# Patient Record
Sex: FEMALE | Race: BLACK OR AFRICAN AMERICAN | NOT HISPANIC OR LATINO | Employment: OTHER | URBAN - METROPOLITAN AREA
[De-identification: names, ages, dates, MRNs, and addresses within clinical notes are randomized per-mention and may not be internally consistent; named-entity substitution may affect disease eponyms.]

---

## 2017-04-07 ENCOUNTER — TELEPHONE (OUTPATIENT)
Dept: UROLOGY | Facility: CLINIC | Age: 72
End: 2017-04-07

## 2017-04-07 NOTE — TELEPHONE ENCOUNTER
Patient indicated that she would be running late for her apt next wed. This has been noted in the apt notes. Just sending this over for your FYI.

## 2017-04-07 NOTE — TELEPHONE ENCOUNTER
"----- Message from Margie Cheema sent at 4/7/2017  8:12 AM CDT -----  Contact: Patient herself  X  1st Request  _  2nd Request  _  3rd Request    Who:  Colette Smith (mrn# [id number])    Why: Patient called and said, "she intends to keep her appointment on next week / Wednesday 04/12/2017 but she will be running late."  THANKS!     What Number to Call Back:  # [phone]  When to Expect a call back: (Before the end of the day)   -- if the call is after 12:00, the call back will be tomorrow.                        "

## 2017-04-12 ENCOUNTER — TELEPHONE (OUTPATIENT)
Dept: UROLOGY | Facility: CLINIC | Age: 72
End: 2017-04-12

## 2017-04-12 ENCOUNTER — OFFICE VISIT (OUTPATIENT)
Dept: UROLOGY | Facility: CLINIC | Age: 72
End: 2017-04-12
Attending: UROLOGY
Payer: MEDICARE

## 2017-04-12 VITALS
SYSTOLIC BLOOD PRESSURE: 123 MMHG | HEIGHT: 63 IN | WEIGHT: 220 LBS | BODY MASS INDEX: 38.98 KG/M2 | DIASTOLIC BLOOD PRESSURE: 62 MMHG | HEART RATE: 47 BPM

## 2017-04-12 DIAGNOSIS — N39.46 MIXED INCONTINENCE URGE AND STRESS: Primary | ICD-10-CM

## 2017-04-12 LAB
BILIRUB SERPL-MCNC: NORMAL MG/DL
BLOOD URINE, POC: NORMAL
COLOR, POC UA: YELLOW
GLUCOSE UR QL STRIP: NORMAL
KETONES UR QL STRIP: NORMAL
LEUKOCYTE ESTERASE URINE, POC: NORMAL
NITRITE, POC UA: NORMAL
PH, POC UA: 5
POC RESIDUAL URINE VOLUME: 37 ML (ref 0–100)
PROTEIN, POC: NORMAL
SPECIFIC GRAVITY, POC UA: 1.01
UROBILINOGEN, POC UA: NORMAL

## 2017-04-12 PROCEDURE — 1160F RVW MEDS BY RX/DR IN RCRD: CPT | Mod: S$GLB,,, | Performed by: UROLOGY

## 2017-04-12 PROCEDURE — 1157F ADVNC CARE PLAN IN RCRD: CPT | Mod: S$GLB,,, | Performed by: UROLOGY

## 2017-04-12 PROCEDURE — 1159F MED LIST DOCD IN RCRD: CPT | Mod: S$GLB,,, | Performed by: UROLOGY

## 2017-04-12 PROCEDURE — 51798 US URINE CAPACITY MEASURE: CPT | Mod: S$GLB,,, | Performed by: UROLOGY

## 2017-04-12 PROCEDURE — 1126F AMNT PAIN NOTED NONE PRSNT: CPT | Mod: S$GLB,,, | Performed by: UROLOGY

## 2017-04-12 PROCEDURE — 81002 URINALYSIS NONAUTO W/O SCOPE: CPT | Mod: S$GLB,,, | Performed by: UROLOGY

## 2017-04-12 PROCEDURE — 99999 PR PBB SHADOW E&M-EST. PATIENT-LVL III: CPT | Mod: PBBFAC,,, | Performed by: UROLOGY

## 2017-04-12 PROCEDURE — 99203 OFFICE O/P NEW LOW 30 MIN: CPT | Mod: 25,S$GLB,, | Performed by: UROLOGY

## 2017-04-12 RX ORDER — DILTIAZEM HYDROCHLORIDE 90 MG/1
90 TABLET, FILM COATED ORAL 2 TIMES DAILY
Refills: 1 | COMMUNITY
Start: 2017-03-10 | End: 2018-08-29

## 2017-04-12 RX ORDER — CLONIDINE HYDROCHLORIDE 0.2 MG/1
0.2 TABLET ORAL 2 TIMES DAILY
Refills: 0 | COMMUNITY
Start: 2017-03-21 | End: 2018-08-29

## 2017-04-12 RX ORDER — NAPROXEN SODIUM 220 MG/1
81 TABLET, FILM COATED ORAL DAILY
Refills: 0 | COMMUNITY
Start: 2017-03-21

## 2017-04-12 RX ORDER — AMLODIPINE AND VALSARTAN 10; 160 MG/1; MG/1
1 TABLET ORAL DAILY
Refills: 0 | COMMUNITY
Start: 2017-02-17 | End: 2018-12-05

## 2017-04-12 RX ORDER — CARVEDILOL 12.5 MG/1
TABLET ORAL
Refills: 0 | COMMUNITY
Start: 2017-01-23

## 2017-04-12 RX ORDER — ATORVASTATIN CALCIUM 40 MG/1
40 TABLET, FILM COATED ORAL DAILY
Refills: 0 | COMMUNITY
Start: 2017-03-02

## 2017-04-12 RX ORDER — MIRABEGRON 50 MG/1
1 TABLET, FILM COATED, EXTENDED RELEASE ORAL DAILY
Refills: 0 | COMMUNITY
Start: 2017-03-01 | End: 2017-11-22 | Stop reason: SDUPTHER

## 2017-04-12 RX ORDER — SOLIFENACIN SUCCINATE 10 MG/1
10 TABLET, FILM COATED ORAL DAILY
Qty: 30 TABLET | Refills: 11 | Status: SHIPPED | OUTPATIENT
Start: 2017-04-12 | End: 2017-11-22 | Stop reason: SDUPTHER

## 2017-04-12 RX ORDER — IBUPROFEN 200 MG
200 TABLET ORAL DAILY PRN
COMMUNITY

## 2017-04-12 NOTE — TELEPHONE ENCOUNTER
Provider still in chart and note unsigned- can you please advise that patient will continue both medication or stop one. Thank you.

## 2017-04-12 NOTE — TELEPHONE ENCOUNTER
----- Message from Nila Augie sent at 4/12/2017  8:46 AM CDT -----  Contact: [phone] Yesenia  x_  1st Request  _  2nd Request  _  3rd Request        Who: [facility]    Why: Yesenia called she stated that the patient is already on a urinary incontinence medication and she just wanted to make sure that the office was aware of that. Before filling the prescription for  solifenacin (VESICARE) 10 MG tablet 30 tablet. Please call her to confirm.    What Number to Call Back: [phone]    When to Expect a call back: (Before the end of the day)   -- if the call is after 12:00, the call back will be tomorrow.                         solifenacin (VESICARE) 10 MG tablet 30 tablet

## 2017-04-12 NOTE — LETTER
April 12, 2017      Iris Jacob, NP  [address]  [facility] [address]           [facility]  [address] [place] [address], [facility] [address]  [facility] [address]  Phone: [phone]          Patient: Colette Manriquez   MR Number: [id number]   YOB: 1945   Date of Visit: 4/12/2017       Dear Iris Jacob:    Thank you for referring Colette Manriquez to me for evaluation. Attached you will find relevant portions of my assessment and plan of care.    If you have questions, please do not hesitate to call me. I look forward to following Colette Manriquez along with you.    Sincerely,    Fay Dobbs MD    Enclosure  CC:  No Recipients    If you would like to receive this communication electronically, please contact [email or web address] or [phone] to request more information on [employer] Link access.    For providers and/or their staff who would like to refer a patient to [facility], please contact us through our one-stop-shop provider referral line, [facility], at [phone].    If you feel you have received this communication in error or would no longer like to receive these types of communications, please e-mail [email or web address]

## 2017-04-12 NOTE — MR AVS SNAPSHOT
[facility]  [address], [address]  [facility] [address]  Phone: [phone]                  Colette Manriquez   2017 8:00 AM   Office Visit    Description:  Female : 1945   Provider:  Fay Dobbs MD   Department:  [facility]           Reason for Visit     Advice Only           Diagnoses this Visit        Comments    Mixed incontinence urge and stress    -  Primary            To Do List           Goals (5 Years of Data)     None      Follow-Up and Disposition     Return in about 6 weeks (around 2017) for Follow up on new medication, PVR check.       These Medications        Disp Refills Start End    solifenacin (VESICARE) 10 MG tablet 30 tablet 11 2017     Take 1 tablet (10 mg total) by mouth once daily. - Oral    [facility] [place], [place] - [address] [place] [address] [facility]: [phone]         [facility]     [facility] Care [facility] -  Assistance  Unless otherwise directed by your provider, please contact [facility], our nurse care line that is available for  assistance.     Registered nurses in the [facility] provide: appointment scheduling, clinical advisement, health education, and other advisory services.  Call: [phone] (toll free)               Medications           START taking these NEW medications        Refills    solifenacin (VESICARE) 10 MG tablet 11    Sig: Take 1 tablet (10 mg total) by mouth once daily.    Class: Normal    Route: Oral           Verify that the below list of medications is an accurate representation of the medications you are currently taking.  If none reported, the list may be blank. If incorrect, please contact your healthcare provider. Carry this list with you in case of emergency.           Current Medications     amlodipine-valsartan (EXFORGE)  mg per tablet Take 1 tablet by mouth once daily.    aspirin 81 MG Chew Take 81 mg by mouth  "once daily.    atorvastatin (LIPITOR) 40 MG tablet Take 40 mg by mouth once daily.    carvedilol (COREG) 12.5 MG tablet     cloNIDine (CATAPRES) 0.2 MG tablet Take 0.2 mg by mouth 2 (two) times daily.    diltiaZEM (CARDIZEM) 90 MG tablet Take 90 mg by mouth 2 (two) times daily.    ibuprofen (ADVIL,MOTRIN) 200 MG tablet Take 200 mg by mouth daily as needed for Pain.    MYRBETRIQ 50 mg Tb24 Take 1 tablet by mouth once daily.    solifenacin (VESICARE) 10 MG tablet Take 1 tablet (10 mg total) by mouth once daily.           Clinical Reference Information           Your Vitals Were     BP Pulse Height Weight BMI    123/62 47 5' 2.5" (1.588 m) 99.8 kg (220 lb) 39.6 kg/m2      Blood Pressure          Most Recent Value    BP  123/62      Allergies as of 4/12/2017     Vicodin [Hydrocodone-acetaminophen]      Immunizations Administered on Date of Encounter - 4/12/2017     None      Orders Placed During Today's Visit      Normal Orders This Visit    POCT Bladder Scan     POCT URINE DIPSTICK WITHOUT MICROSCOPE       [facility] Sign-Up     Activating your [facility] account is as easy as 1-2-3!     1) Visit [email or web address], select Sign Up Now, enter this activation code and your date of birth, then select Next.  [email or web address]  Expires: 5/27/2017  8:37 AM      2) Create a username and password to use when you visit [facility] in the future and select a security question in case you lose your password and select Next.    3) Enter your e-mail address and click Sign Up!    Additional Information  If you have questions, please e-mail [email or web address] or call [phone] to talk to our [facility] staff. Remember, [facility] is NOT to be used for urgent needs. For medical emergencies, dial 911.         Language Assistance Services     ATTENTION: Language assistance services are available, free of charge. Please call [phone].      ATENCIÓN: Si habla español, tiene a sanchez disposición servicios gratuitos de asistencia lingüística. " Roseann orellana [phone].     DARLENE Ý: N?u b?n nói Ti?ng Vi?t, có các d?ch v? h? tr? ngôn ng? mi?n phí dành cho b?n. G?i s? [phone].         [facility] complies with applicable Federal civil rights laws and does not discriminate on the basis of race, color, national origin, age, disability, or sex.

## 2017-05-24 ENCOUNTER — OFFICE VISIT (OUTPATIENT)
Dept: UROLOGY | Facility: CLINIC | Age: 72
End: 2017-05-24
Attending: UROLOGY
Payer: MEDICARE

## 2017-05-24 VITALS
HEIGHT: 63 IN | HEART RATE: 62 BPM | SYSTOLIC BLOOD PRESSURE: 148 MMHG | BODY MASS INDEX: 38.98 KG/M2 | WEIGHT: 220 LBS | DIASTOLIC BLOOD PRESSURE: 81 MMHG

## 2017-05-24 DIAGNOSIS — N39.46 MIXED INCONTINENCE URGE AND STRESS: Primary | ICD-10-CM

## 2017-05-24 PROCEDURE — 1159F MED LIST DOCD IN RCRD: CPT | Mod: S$GLB,,, | Performed by: UROLOGY

## 2017-05-24 PROCEDURE — 99214 OFFICE O/P EST MOD 30 MIN: CPT | Mod: 25,S$GLB,, | Performed by: UROLOGY

## 2017-05-24 PROCEDURE — 99999 PR PBB SHADOW E&M-EST. PATIENT-LVL III: CPT | Mod: PBBFAC,,, | Performed by: UROLOGY

## 2017-05-24 PROCEDURE — 1160F RVW MEDS BY RX/DR IN RCRD: CPT | Mod: S$GLB,,, | Performed by: UROLOGY

## 2017-05-24 PROCEDURE — 51798 US URINE CAPACITY MEASURE: CPT | Mod: S$GLB,,, | Performed by: UROLOGY

## 2017-05-24 PROCEDURE — 1126F AMNT PAIN NOTED NONE PRSNT: CPT | Mod: S$GLB,,, | Performed by: UROLOGY

## 2017-05-24 NOTE — PROGRESS NOTES
Subjective:       Colette Manriquez is a 71 y.o. female who is a new patient who was referred by Dr Jacob for evaluation of UI.      Urinary Incontinence  Patient complains of urinary incontinence. This has been present for many years. She leaks urine with coughing, lifting, laughing, sneezing, insensate leakage, and with urge (most bothersome). Patient describes the symptoms as frequent urination (manyx per day), nocturia 3 times per night, sensation of incomplete emptying of bladder, urge to urinate with little or no warning and urine leaking unpredictably. Factors associated with symptoms include none known. Evaluation to date includes none. Treatment to date includes Botox 200u by Dr Tobar in 12/2011 that was helpful. She remains on Myrbetriq now, not as helpful as previous. She tried PFPT in the past.     She is wearing diapers at night, multiple pads during day. She drinks a lot of water but avoids bladder irritants. Denies UTIs.     PVR (bladder scan) last visit: 37-80cc (on different scans), today - 21cc (not true PVR)    She is now with Myrbetriq and Vesicare - some improvement but still with diapers at night and pads during day.       The following portions of the patient's history were reviewed and updated as appropriate: allergies, current medications, past family history, past medical history, past social history, past surgical history and problem list.    Review of Systems  Constitutional: no fever or chills  ENT: no nasal congestion or sore throat  Respiratory: no cough or shortness of breath  Cardiovascular: no chest pain or palpitations  Gastrointestinal: no nausea or vomiting, tolerating diet  Genitourinary: as per HPI  Hematologic/Lymphatic: no easy bruising or lymphadenopathy  Musculoskeletal: no arthralgias or myalgias  Skin: no rashes or lesions  Neurological: no seizures or tremors  Behavioral/Psych: no auditory or visual hallucinations       Objective:    Vitals: BP (!) 148/81 (BP  "Location: Right arm, Patient Position: Sitting, BP Method: Automatic)   Pulse 62   Ht 5' 2.5" (1.588 m)   Wt 99.8 kg (220 lb)   BMI 39.60 kg/m²     Physical Exam   General: well developed, well nourished in no acute distress  Head: normocephalic, atraumatic  Neck: supple, trachea midline, no obvious enlargement of thyroid  HEENT: EOMI, mucus membranes moist, sclera anicteric, no hearing impairment  Lungs: symmetric expansion, non-labored breathing  Cardiovascular: regular rate and rhythm, normal pulses  Abdomen: soft, non tender, non distended, no palpable masses, no hepatosplenomegaly, no hernias, no CVA tenderness  Musculoskeletal: no peripheral edema, normal ROM in bilateral upper and lower extremities  Lymphatics: no cervical or inguinal lymphadenopathy  Skin: no rashes or lesions  Neuro: alert and oriented x 3, no gross deficits  Psych: normal judgment and insight, normal mood/affect and non-anxious  Genitourinary:   patient declined exam      Lab Review   Urine analysis today in clinic shows negative for all components     Lab Results   Component Value Date    WBC 3.60 (L) 12/05/2011    HGB 12.4 12/05/2011    HCT 38.2 12/05/2011    MCV 86.6 12/05/2011     12/05/2011     Lab Results   Component Value Date    CREATININE 1.2 12/05/2011    BUN 22 12/05/2011       Imaging  NA       Assessment/Plan:      1. Mixed incontinence urge and stress    - Discussed difference of UUI and JE components. Reviewed etiology and workup of each.   - JE: Kegels, PFPT, pessary, bulking agent, MUS.   - UUI: Behavioral changes, PFPT, anticholinergics, mirabegron. Botox/InterStim for refractory UUI.   - UUI much more bothersome   - Prior Botox in 2011   - Continue Myrbetriq, Vesicare   - Consider cysto/UDS in near future. ?Repeat Botox ?InterStim. She is not inclined to pursue this at this time. Will consider Botox in future.            Follow up in 6 months with PVR     "

## 2017-11-22 ENCOUNTER — OFFICE VISIT (OUTPATIENT)
Dept: UROLOGY | Facility: CLINIC | Age: 72
End: 2017-11-22
Attending: UROLOGY
Payer: MEDICARE

## 2017-11-22 VITALS
WEIGHT: 220 LBS | SYSTOLIC BLOOD PRESSURE: 140 MMHG | DIASTOLIC BLOOD PRESSURE: 78 MMHG | BODY MASS INDEX: 38.98 KG/M2 | HEART RATE: 70 BPM | HEIGHT: 63 IN

## 2017-11-22 DIAGNOSIS — N39.46 MIXED INCONTINENCE URGE AND STRESS: Primary | ICD-10-CM

## 2017-11-22 LAB
BILIRUB SERPL-MCNC: ABNORMAL MG/DL
BLOOD URINE, POC: 50
COLOR, POC UA: YELLOW
GLUCOSE UR QL STRIP: ABNORMAL
KETONES UR QL STRIP: ABNORMAL
LEUKOCYTE ESTERASE URINE, POC: ABNORMAL
NITRITE, POC UA: ABNORMAL
PH, POC UA: 5
POC RESIDUAL URINE VOLUME: 29 ML (ref 0–100)
PROTEIN, POC: ABNORMAL
SPECIFIC GRAVITY, POC UA: 1.01
UROBILINOGEN, POC UA: ABNORMAL

## 2017-11-22 PROCEDURE — 81002 URINALYSIS NONAUTO W/O SCOPE: CPT | Mod: S$GLB,,, | Performed by: UROLOGY

## 2017-11-22 PROCEDURE — 99213 OFFICE O/P EST LOW 20 MIN: CPT | Mod: 25,S$GLB,, | Performed by: UROLOGY

## 2017-11-22 PROCEDURE — 51798 US URINE CAPACITY MEASURE: CPT | Mod: S$GLB,,, | Performed by: UROLOGY

## 2017-11-22 RX ORDER — SOLIFENACIN SUCCINATE 10 MG/1
10 TABLET, FILM COATED ORAL DAILY
Qty: 90 TABLET | Refills: 3 | Status: SHIPPED | OUTPATIENT
Start: 2017-11-22 | End: 2018-12-05 | Stop reason: SDUPTHER

## 2017-11-22 RX ORDER — MIRABEGRON 50 MG/1
1 TABLET, FILM COATED, EXTENDED RELEASE ORAL DAILY
Qty: 90 TABLET | Refills: 3 | Status: SHIPPED | OUTPATIENT
Start: 2017-11-22 | End: 2018-08-29

## 2017-11-22 NOTE — PROGRESS NOTES
Subjective:       Colette Manriquez is a 71 y.o. female who is an established patient who was referred by Dr Jacob for evaluation of UI.      Urinary Incontinence  Patient complains of urinary incontinence. This has been present for many years. She leaks urine with coughing, lifting, laughing, sneezing, insensate leakage, and with urge (most bothersome). Patient describes the symptoms as frequent urination (manyx per day), nocturia 3 times per night, sensation of incomplete emptying of bladder, urge to urinate with little or no warning and urine leaking unpredictably. Factors associated with symptoms include none known. Evaluation to date includes none. Treatment to date includes Botox 200u by Dr Tobar in 12/2011 that was helpful. She remains on Myrbetriq now, not as helpful as previous. She tried PFPT in the past.     She is wearing diapers at night, multiple pads during day. She drinks a lot of water but avoids bladder irritants. Denies UTIs.     PVR (bladder scan) last visits: 37-80cc (on different scans),  21cc (not true PVR). Today - 29cc.    She is now with Myrbetriq and Vesicare - some improvement but still with diapers at night and pads during day. Declined further intervention previously. She is happy with symptoms.       The following portions of the patient's history were reviewed and updated as appropriate: allergies, current medications, past family history, past medical history, past social history, past surgical history and problem list.    Review of Systems  Constitutional: no fever or chills  ENT: no nasal congestion or sore throat  Respiratory: no cough or shortness of breath  Cardiovascular: no chest pain or palpitations  Gastrointestinal: no nausea or vomiting, tolerating diet  Genitourinary: as per HPI  Hematologic/Lymphatic: no easy bruising or lymphadenopathy  Musculoskeletal: no arthralgias or myalgias  Skin: no rashes or lesions  Neurological: no seizures or tremors  Behavioral/Psych:  "no auditory or visual hallucinations       Objective:    Vitals: BP (!) 140/78 (BP Location: Right arm, Patient Position: Sitting, BP Method: X-Large (Automatic))   Pulse 70   Ht 5' 2.5" (1.588 m)   Wt 99.8 kg (220 lb)   BMI 39.60 kg/m²     Physical Exam   General: well developed, well nourished in no acute distress  Head: normocephalic, atraumatic  Neck: supple, trachea midline, no obvious enlargement of thyroid  HEENT: EOMI, mucus membranes moist, sclera anicteric, no hearing impairment  Lungs: symmetric expansion, non-labored breathing  Cardiovascular: regular rate and rhythm, normal pulses  Abdomen: soft, non tender, non distended, no palpable masses, no hepatosplenomegaly, no hernias, no CVA tenderness  Musculoskeletal: no peripheral edema, normal ROM in bilateral upper and lower extremities  Lymphatics: no cervical or inguinal lymphadenopathy  Skin: no rashes or lesions  Neuro: alert and oriented x 3, no gross deficits  Psych: normal judgment and insight, normal mood/affect and non-anxious  Genitourinary:   patient declined exam      Lab Review   Urine analysis today in clinic shows - +LE, 50 RBCs    Lab Results   Component Value Date    WBC 3.60 (L) 12/05/2011    HGB 12.4 12/05/2011    HCT 38.2 12/05/2011    MCV 86.6 12/05/2011     12/05/2011     Lab Results   Component Value Date    CREATININE 1.2 12/05/2011    BUN 22 12/05/2011       Imaging  NA       Assessment/Plan:      1. Mixed incontinence urge and stress    - Discussed difference of UUI and JE components. Reviewed etiology and workup of each.   - JE: Kegels, PFPT, pessary, bulking agent, MUS.   - UUI: Behavioral changes, PFPT, anticholinergics, mirabegron. Botox/InterStim for refractory UUI.   - UUI much more bothersome   - Prior Botox in 2011   - Continue Myrbetriq, Vesicare   - Consider cysto/UDS in near future. ?Repeat Botox ?InterStim. She is not inclined to pursue this at this time. Will consider Botox in future.             Follow " up in 12 months with PVR

## 2017-11-28 ENCOUNTER — TELEPHONE (OUTPATIENT)
Dept: UROLOGY | Facility: CLINIC | Age: 72
End: 2017-11-28

## 2017-11-28 NOTE — TELEPHONE ENCOUNTER
----- Message from Ronda Shell sent at 11/28/2017 10:39 AM CST -----  Contact: [employer]  solifenacin (VESICARE) 10 MG tablet  MYRBETRIQ 50 mg Tb24      Who: Antonia Jasso from [employer]    Why: Requesting a call back in regards to the two RX listed above, the are similar      What Number to Call Back: [phone]    When to Expect a call back: (Within 24 hours)    Please return the call at earliest convenience. Thanks!

## 2018-08-29 ENCOUNTER — OFFICE VISIT (OUTPATIENT)
Dept: GASTROENTEROLOGY | Facility: CLINIC | Age: 73
End: 2018-08-29
Payer: MEDICARE

## 2018-08-29 ENCOUNTER — TELEPHONE (OUTPATIENT)
Dept: ENDOSCOPY | Facility: HOSPITAL | Age: 73
End: 2018-08-29

## 2018-08-29 VITALS
WEIGHT: 207.25 LBS | SYSTOLIC BLOOD PRESSURE: 129 MMHG | HEART RATE: 58 BPM | HEIGHT: 62 IN | BODY MASS INDEX: 38.14 KG/M2 | DIASTOLIC BLOOD PRESSURE: 74 MMHG

## 2018-08-29 DIAGNOSIS — R19.5 OCCULT BLOOD IN STOOLS: Primary | ICD-10-CM

## 2018-08-29 DIAGNOSIS — Z12.11 SPECIAL SCREENING FOR MALIGNANT NEOPLASMS, COLON: ICD-10-CM

## 2018-08-29 DIAGNOSIS — Z12.11 SPECIAL SCREENING FOR MALIGNANT NEOPLASMS, COLON: Primary | ICD-10-CM

## 2018-08-29 PROCEDURE — 99204 OFFICE O/P NEW MOD 45 MIN: CPT | Mod: S$GLB,,, | Performed by: INTERNAL MEDICINE

## 2018-08-29 PROCEDURE — 99999 PR PBB SHADOW E&M-EST. PATIENT-LVL III: CPT | Mod: PBBFAC,,, | Performed by: INTERNAL MEDICINE

## 2018-08-29 RX ORDER — POLYETHYLENE GLYCOL 3350, SODIUM SULFATE ANHYDROUS, SODIUM BICARBONATE, SODIUM CHLORIDE, POTASSIUM CHLORIDE 236; 22.74; 6.74; 5.86; 2.97 G/4L; G/4L; G/4L; G/4L; G/4L
4 POWDER, FOR SOLUTION ORAL ONCE
Qty: 4000 ML | Refills: 0 | Status: SHIPPED | OUTPATIENT
Start: 2018-08-29 | End: 2018-08-30

## 2018-08-29 RX ORDER — POLYETHYLENE GLYCOL 3350, SODIUM SULFATE ANHYDROUS, SODIUM BICARBONATE, SODIUM CHLORIDE, POTASSIUM CHLORIDE 236; 22.74; 6.74; 5.86; 2.97 G/4L; G/4L; G/4L; G/4L; G/4L
4 POWDER, FOR SOLUTION ORAL ONCE
Qty: 4000 ML | Refills: 0 | Status: SHIPPED | OUTPATIENT
Start: 2018-08-29 | End: 2018-08-29 | Stop reason: SDUPTHER

## 2018-08-29 NOTE — PROGRESS NOTES
REFERRING PHYSICIAN:  Arturo Clark M.D.    REASON FOR VISIT:  Occult positive blood in the stool.    HISTORY OF PRESENT ILLNESS:  Ms. Manriquez has been referred to us by Dr. Clark from [facility] because of occult blood positive in the   stool.  Ms. Manriquez denies any overt gastrointestinal bleeding.  Denies any   dysphagia, odynophagia, nausea, vomiting, abdominal pain.  No changes in bowel   pattern.  She usually moves her bowels every other day and occasionally on a   daily basis.  She has not noticed any red blood, melena or maroon stools.  She   does take aspirin 81 mg daily and ibuprofen once or twice every few weeks.  She   denies any unintentional weight loss.  Her appetite is normal.  No recent   anticoagulants in her medication regimen.    PAST MEDICAL, SURGICAL, SOCIAL AND FAMILY HISTORY:  Reviewed.    MEDICATIONS AND ALLERGIES:  Reviewed.    REVIEW OF SYSTEMS:  CONSTITUTIONAL:  No fever, no chills, no weight loss.  Appetite is normal.  EYES:  No visual changes.  ENT:  No odynophagia or hoarseness of voice.  CARDIOVASCULAR:  No angina or palpitation.  RESPIRATORY:  No shortness of breath or wheezing.  GENITOURINARY:  No dysuria or frequency.  MUSCULOSKELETAL:  Occasional arthralgias and headaches.  SKIN:  No pruritus or eczema.  NEUROLOGIC:  No seizures.  PSYCHIATRIC:  No anxiety or depression.  GASTROINTESTINAL:  See HPI.    PHYSICAL EXAMINATION:  VITAL SIGNS:  See EPIC.  GENERAL:  Alert and oriented x3, no acute distress.  NECK:  Supple, no carotid bruit.  No cervical adenopathy.  ABDOMEN:  Obese, soft, nondistended, nontender.  No masses palpable.  No   hepatosplenomegaly appreciated.  Bowel sounds are normal.  EYES:  Conjunctivae anicteric.  ENT:  Oropharynx, mucosa moist.  Mallampati 2.  CARDIOVASCULAR:  S1, S2 normal.  RESPIRATORY:  Bilateral air entry equal.  SKIN:  No palmar erythema or spider angiomata.  NEUROLOGIC:  No asterixis.  PSYCHIATRIC:  Affect appropriate.  LOWER  EXTREMITIES:  No pedal edema.    IMPRESSION:  Fecal occult blood positive.    RECOMMENDATION:  Proceed with EGD and colonoscopy and further recommendation   will be based on above.      ACT/HN  dd: 08/29/2018 11:44:46 (CDT)  td: 08/30/2018 05:03:20 (CDT)  Doc ID   #[id number]  Job ID #[id number]    CC:

## 2018-08-29 NOTE — LETTER
August 29, 2018      Arturo Clark MD  [address] [place] [address]  [place]  [place] [address]           [facility]  [address]  [facility] [address]  Phone: [phone]  Fax: [phone]          Patient: Colette Manriquez   MR Number: [id number]   YOB: 1945   Date of Visit: 8/29/2018       Dear Dr. Arturo Clark:    Thank you for referring Colette Manriquez to me for evaluation. Attached you will find relevant portions of my assessment and plan of care.    If you have questions, please do not hesitate to call me. I look forward to following Colette Manriquez along with you.    Sincerely,    Binh Holt MD    Enclosure  CC:  No Recipients    If you would like to receive this communication electronically, please contact [email or web address] or [phone] to request more information on [employer] Link access.    For providers and/or their staff who would like to refer a patient to [facility], please contact us through our one-stop-shop provider referral line, [facility], at [phone].    If you feel you have received this communication in error or would no longer like to receive these types of communications, please e-mail [email or web address]

## 2018-10-02 ENCOUNTER — ANESTHESIA EVENT (OUTPATIENT)
Dept: ENDOSCOPY | Facility: HOSPITAL | Age: 73
End: 2018-10-02
Payer: MEDICARE

## 2018-10-03 ENCOUNTER — ANESTHESIA (OUTPATIENT)
Dept: ENDOSCOPY | Facility: HOSPITAL | Age: 73
End: 2018-10-03
Payer: MEDICARE

## 2018-10-03 ENCOUNTER — HOSPITAL ENCOUNTER (OUTPATIENT)
Facility: HOSPITAL | Age: 73
Discharge: HOME | End: 2018-10-03
Attending: INTERNAL MEDICINE | Admitting: INTERNAL MEDICINE
Payer: MEDICARE

## 2018-10-03 VITALS
TEMPERATURE: 98 F | BODY MASS INDEX: 40.48 KG/M2 | WEIGHT: 220 LBS | DIASTOLIC BLOOD PRESSURE: 72 MMHG | OXYGEN SATURATION: 100 % | HEIGHT: 62 IN | HEART RATE: 59 BPM | SYSTOLIC BLOOD PRESSURE: 145 MMHG | RESPIRATION RATE: 18 BRPM

## 2018-10-03 DIAGNOSIS — R19.5 OCCULT BLOOD IN STOOLS: Primary | ICD-10-CM

## 2018-10-03 PROCEDURE — 43239 EGD BIOPSY SINGLE/MULTIPLE: CPT | Performed by: INTERNAL MEDICINE

## 2018-10-03 PROCEDURE — 88342 IMHCHEM/IMCYTCHM 1ST ANTB: CPT | Mod: 26,,, | Performed by: PATHOLOGY

## 2018-10-03 PROCEDURE — 25000003 PHARM REV CODE 250: Performed by: NURSE ANESTHETIST, CERTIFIED REGISTERED

## 2018-10-03 PROCEDURE — 43239 EGD BIOPSY SINGLE/MULTIPLE: CPT | Mod: 51,,, | Performed by: INTERNAL MEDICINE

## 2018-10-03 PROCEDURE — 37000008 HC ANESTHESIA 1ST 15 MINUTES: Performed by: INTERNAL MEDICINE

## 2018-10-03 PROCEDURE — 45378 DIAGNOSTIC COLONOSCOPY: CPT | Performed by: INTERNAL MEDICINE

## 2018-10-03 PROCEDURE — 88305 TISSUE EXAM BY PATHOLOGIST: CPT | Performed by: PATHOLOGY

## 2018-10-03 PROCEDURE — 63600175 PHARM REV CODE 636 W HCPCS: Performed by: NURSE ANESTHETIST, CERTIFIED REGISTERED

## 2018-10-03 PROCEDURE — E9220 PRA ENDO ANESTHESIA: HCPCS | Mod: ,,, | Performed by: NURSE ANESTHETIST, CERTIFIED REGISTERED

## 2018-10-03 PROCEDURE — 25000003 PHARM REV CODE 250: Performed by: INTERNAL MEDICINE

## 2018-10-03 PROCEDURE — 45378 DIAGNOSTIC COLONOSCOPY: CPT | Mod: ,,, | Performed by: INTERNAL MEDICINE

## 2018-10-03 PROCEDURE — 37000009 HC ANESTHESIA EA ADD 15 MINS: Performed by: INTERNAL MEDICINE

## 2018-10-03 PROCEDURE — 27201012 HC FORCEPS, HOT/COLD, DISP: Performed by: INTERNAL MEDICINE

## 2018-10-03 PROCEDURE — 88305 TISSUE EXAM BY PATHOLOGIST: CPT | Mod: 26,,, | Performed by: PATHOLOGY

## 2018-10-03 RX ORDER — GLYCOPYRROLATE 0.2 MG/ML
INJECTION INTRAMUSCULAR; INTRAVENOUS
Status: DISCONTINUED | OUTPATIENT
Start: 2018-10-03 | End: 2018-10-03

## 2018-10-03 RX ORDER — PROPOFOL 10 MG/ML
VIAL (ML) INTRAVENOUS
Status: DISCONTINUED | OUTPATIENT
Start: 2018-10-03 | End: 2018-10-03

## 2018-10-03 RX ORDER — PROPOFOL 10 MG/ML
VIAL (ML) INTRAVENOUS CONTINUOUS PRN
Status: DISCONTINUED | OUTPATIENT
Start: 2018-10-03 | End: 2018-10-03

## 2018-10-03 RX ORDER — SODIUM CHLORIDE 9 MG/ML
INJECTION, SOLUTION INTRAVENOUS CONTINUOUS
Status: DISCONTINUED | OUTPATIENT
Start: 2018-10-03 | End: 2018-10-03 | Stop reason: HOSPADM

## 2018-10-03 RX ADMIN — SODIUM CHLORIDE: 0.9 INJECTION, SOLUTION INTRAVENOUS at 07:10

## 2018-10-03 RX ADMIN — PROPOFOL 50 MG: 10 INJECTION, EMULSION INTRAVENOUS at 08:10

## 2018-10-03 RX ADMIN — PROPOFOL 150 MCG/KG/MIN: 10 INJECTION, EMULSION INTRAVENOUS at 08:10

## 2018-10-03 RX ADMIN — TOPICAL ANESTHETIC 1 EACH: 200 SPRAY DENTAL; PERIODONTAL at 08:10

## 2018-10-03 RX ADMIN — GLYCOPYRROLATE 0.1 MG: 0.2 INJECTION, SOLUTION INTRAMUSCULAR; INTRAVENOUS at 08:10

## 2018-10-03 NOTE — [employer] PATIENT INSTRUCTIONS
Discharge Summary/Instructions after an Endoscopic Procedure  Patient Name: Colette Manriquez  Patient MRN: [id number]  Patient YOB: 1945 Wednesday, October 03, 2018  Bnih Holt MD  RESTRICTIONS:  During your procedure today, you received medications for sedation.  These   medications may affect your judgment, balance and coordination.  Therefore,   for 24 hours, you have the following restrictions:   - DO NOT drive a car, operate machinery, make legal/financial decisions,   sign important papers or drink alcohol.    ACTIVITY:  Today: no heavy lifting, straining or running due to procedural   sedation/anesthesia.  The following day: return to full activity including work.  DIET:  Eat and drink normally unless instructed otherwise.     TREATMENT FOR COMMON SIDE EFFECTS:  - Mild abdominal pain, nausea, belching, bloating or excessive gas:  rest,   eat lightly and use a heating pad.  - Sore Throat: treat with throat lozenges and/or gargle with warm salt   water.  - Because air was used during the procedure, expelling large amounts of air   from your rectum or belching is normal.  - If a bowel prep was taken, you may not have a bowel movement for 1-3 days.    This is normal.  SYMPTOMS TO WATCH FOR AND REPORT TO YOUR PHYSICIAN:  1. Abdominal pain or bloating, other than gas cramps.  2. Chest pain.  3. Back pain.  4. Signs of infection such as: chills or fever occurring within 24 hours   after the procedure.  5. Rectal bleeding, which would show as bright red, maroon, or black stools.   (A tablespoon of blood from the rectum is not serious, especially if   hemorrhoids are present.)  6. Vomiting.  7. Weakness or dizziness.  GO DIRECTLY TO THE NEAREST EMERGENCY ROOM IF YOU HAVE ANY OF THE FOLLOWING:      Difficulty breathing              Chills and/or fever over 101 F   Persistent vomiting and/or vomiting blood   Severe abdominal pain   Severe chest pain   Black, tarry stools   Bleeding- more than one  tablespoon   Any other symptom or condition that you feel may need urgent attention  Your doctor recommends these additional instructions:  If any biopsies were taken, your doctors clinic will contact you in 1 to 2   weeks with any results.  - Patient has a contact number available for emergencies.  The signs and   symptoms of potential delayed complications were discussed with the   patient.  Return to normal activities tomorrow.  Written discharge   instructions were provided to the patient.   - Discharge patient to home.   - Resume previous diet.   - Continue present medications.   - Await pathology results.   For questions, problems or results please call your physician - Binh Holt MD at Work:  [phone].  [facility], EMERGENCY ROOM PHONE NUMBER: [phone]  IF A COMPLICATION OR EMERGENCY SITUATION ARISES AND YOU ARE UNABLE TO REACH   YOUR PHYSICIAN - GO DIRECTLY TO THE EMERGENCY ROOM.  Binh Holt MD  10/3/2018 8:23:56 AM  This report has been verified and signed electronically.  [employer]

## 2018-10-03 NOTE — ANESTHESIA PREPROCEDURE EVALUATION
10/03/2018  Colette Manriquez is a 72 y.o., female.    Anesthesia Evaluation    I have reviewed the Patient Summary Reports.    I have reviewed the Nursing Notes.   I have reviewed the Medications.     Review of Systems  Anesthesia Hx:  No problems with previous Anesthesia    Social:  Former Smoker    Cardiovascular:   Hypertension hyperlipidemia        Physical Exam  General:  Well nourished    Airway/Jaw/Neck:  Airway Findings: Mouth Opening: Normal Tongue: Large  General Airway Assessment: Adult  Mallampati: II  Improves to II with phonation.      Dental:  Dental Findings: Upper Dentures, lower partial dentures              Anesthesia Plan  Type of Anesthesia, risks & benefits discussed:  Anesthesia Type:  general  Patient's Preference:   Intra-op Monitoring Plan: standard ASA monitors  Intra-op Monitoring Plan Comments:   Post Op Pain Control Plan:   Post Op Pain Control Plan Comments:   Induction:   IV  Beta Blocker:  Patient is on a Beta-Blocker and has received one dose within the past 24 hours (No further documentation required).       Informed Consent: Patient understands risks and agrees with Anesthesia plan.  Questions answered. Anesthesia consent signed with patient.  ASA Score: 2     Day of Surgery Review of History & Physical:  There are no significant changes.          Ready For Surgery From Anesthesia Perspective.

## 2018-10-03 NOTE — H&P
Short Stay Endoscopy History and Physical    PCP - Anand Hernandez Jr, MD    Procedure - EGD/Colonoscopy  Sedation: GA  ASA - per anesthesia  Mallampati - per anesthesia  History of Anesthesia problems - no  Family history Anesthesia problems -  no     HPI:  This is a 72 y.o. female here for evaluation of : Occult blood in stool    Reflux - no  Dysphagia - no  Abdominal pain - no  Diarrhea - no    ROS:  Constitutional: No fevers, chills, No weight loss  ENT: No allergies  CV: No chest pain  Pulm: No cough, No shortness of breath  Ophtho: No vision changes  GI: see HPI  Medical History:  has a past medical history of Back pain, Hypertension, and Incontinence.    Surgical History:  has a past surgical history that includes Cholecystectomy; Back surgery; and Hysterectomy.    Family History: family history includes Cancer in her father and mother; Heart disease in her father and mother; Hypertension in her father and mother.. Otherwise no colon cancer, inflammatory bowel disease, or GI malignancies.    Social History:  reports that she quit smoking about 21 years ago. She does not have any smokeless tobacco history on file. She reports that she drinks alcohol. She reports that she does not use drugs.    Review of patient's allergies indicates:   Allergen Reactions    Percocet [oxycodone-acetaminophen]     Vicodin [hydrocodone-acetaminophen] Hallucinations       Medications:   Medications Prior to Admission   Medication Sig Dispense Refill Last Dose    amlodipine-valsartan (EXFORGE)  mg per tablet Take 1 tablet by mouth once daily.  0 10/2/2018 at Unknown time    aspirin 81 MG Chew Take 81 mg by mouth once daily.  0 10/2/2018 at Unknown time    atorvastatin (LIPITOR) 40 MG tablet Take 40 mg by mouth once daily.  0 10/2/2018 at Unknown time    carvedilol (COREG) 12.5 MG tablet   0 10/2/2018 at Unknown time    ibuprofen (ADVIL,MOTRIN) 200 MG tablet Take 200 mg by mouth daily as needed for Pain.   Past Month at  Unknown time    solifenacin (VESICARE) 10 MG tablet Take 1 tablet (10 mg total) by mouth once daily. 90 tablet 3 10/2/2018 at Unknown time       Objective Findings:    Vital Signs: Per nursing notes.    Physical Exam:  General Appearance: Well appearing in no acute distress  Head:   Normocephalic, without obvious abnormality  Eyes:    No scleral icterus  Airway: Open  Neck: No restriction in mobility  Lungs: CTA bilaterally in anterior and posterior fields, no wheezes, no crackles.  Heart:  Regular rate and rhythm, S1, S2 normal, no murmurs heard  Abdomen: Soft, non tender, non distended      Labs:  Lab Results   Component Value Date    WBC 3.60 (L) 12/05/2011    HGB 12.4 12/05/2011    HCT 38.2 12/05/2011     12/05/2011     12/05/2011    K 4.8 12/05/2011     12/05/2011    CREATININE 1.2 12/05/2011    BUN 22 12/05/2011    CO2 23 12/05/2011         I have explained the risks and benefits of endoscopy procedures to the patient including but not limited to bleeding, perforation, infection, and death.    Thank you so much for allowing me to participate in the care of Colette Holt MD

## 2018-10-03 NOTE — TRANSFER OF CARE
"Anesthesia Transfer of Care Note    Patient: Colette Manriquez    Procedure(s) Performed: Procedure(s) (LRB):  EGD (ESOPHAGOGASTRODUODENOSCOPY) (N/A)  COLONOSCOPY (N/A)    Patient location: PACU    Anesthesia Type: general    Transport from OR: Transported from OR on room air with adequate spontaneous ventilation    Post pain: adequate analgesia    Post assessment: no apparent anesthetic complications and tolerated procedure well    Post vital signs: stable    Level of consciousness: sedated    Nausea/Vomiting: no nausea/vomiting    Complications: none    Transfer of care protocol was followed      Last vitals:   Visit Vitals  BP (!) 90/56   Pulse 63   Temp 36.6 °C (97.9 °F)   Resp 18   Ht 5' 2" (1.575 m)   Wt 99.8 kg (220 lb)   SpO2 97%   Breastfeeding? No   BMI 40.24 kg/m²     "

## 2018-10-03 NOTE — [employer] PATIENT INSTRUCTIONS
Discharge Summary/Instructions after an Endoscopic Procedure  Patient Name: Colette Manriquez  Patient MRN: [id number]  Patient YOB: 1945 Wednesday, October 03, 2018  Binh Holt MD  RESTRICTIONS:  During your procedure today, you received medications for sedation.  These   medications may affect your judgment, balance and coordination.  Therefore,   for 24 hours, you have the following restrictions:   - DO NOT drive a car, operate machinery, make legal/financial decisions,   sign important papers or drink alcohol.    ACTIVITY:  Today: no heavy lifting, straining or running due to procedural   sedation/anesthesia.  The following day: return to full activity including work.  DIET:  Eat and drink normally unless instructed otherwise.     TREATMENT FOR COMMON SIDE EFFECTS:  - Mild abdominal pain, nausea, belching, bloating or excessive gas:  rest,   eat lightly and use a heating pad.  - Sore Throat: treat with throat lozenges and/or gargle with warm salt   water.  - Because air was used during the procedure, expelling large amounts of air   from your rectum or belching is normal.  - If a bowel prep was taken, you may not have a bowel movement for 1-3 days.    This is normal.  SYMPTOMS TO WATCH FOR AND REPORT TO YOUR PHYSICIAN:  1. Abdominal pain or bloating, other than gas cramps.  2. Chest pain.  3. Back pain.  4. Signs of infection such as: chills or fever occurring within 24 hours   after the procedure.  5. Rectal bleeding, which would show as bright red, maroon, or black stools.   (A tablespoon of blood from the rectum is not serious, especially if   hemorrhoids are present.)  6. Vomiting.  7. Weakness or dizziness.  GO DIRECTLY TO THE NEAREST EMERGENCY ROOM IF YOU HAVE ANY OF THE FOLLOWING:      Difficulty breathing              Chills and/or fever over 101 F   Persistent vomiting and/or vomiting blood   Severe abdominal pain   Severe chest pain   Black, tarry stools   Bleeding- more than one  tablespoon   Any other symptom or condition that you feel may need urgent attention  Your doctor recommends these additional instructions:  If any biopsies were taken, your doctors clinic will contact you in 1 to 2   weeks with any results.  - Patient has a contact number available for emergencies.  The signs and   symptoms of potential delayed complications were discussed with the   patient.  Return to normal activities tomorrow.  Written discharge   instructions were provided to the patient.   - Discharge patient to home.   - Resume previous diet.   - Continue present medications.   - Repeat colonoscopy is not recommended for screening purposes.   For questions, problems or results please call your physician - Binh Holt MD at Work:  [phone].  [facility] [place], EMERGENCY ROOM PHONE NUMBER: [phone]  IF A COMPLICATION OR EMERGENCY SITUATION ARISES AND YOU ARE UNABLE TO REACH   YOUR PHYSICIAN - GO DIRECTLY TO THE EMERGENCY ROOM.  Binh Holt MD  10/3/2018 8:38:22 AM  This report has been verified and signed electronically.  [employer]

## 2018-10-03 NOTE — ANESTHESIA POSTPROCEDURE EVALUATION
"Anesthesia Post Evaluation    Patient: Colette Manriquez    Procedure(s) Performed: Procedure(s) (LRB):  EGD (ESOPHAGOGASTRODUODENOSCOPY) (N/A)  COLONOSCOPY (N/A)    Final Anesthesia Type: general  Patient location during evaluation: PACU  Patient participation: Yes- Able to Participate  Level of consciousness: awake and alert and oriented  Pain management: adequate  Airway patency: patent  PONV status at discharge: No PONV  Anesthetic complications: no      Cardiovascular status: blood pressure returned to baseline and hemodynamically stable  Respiratory status: unassisted  Hydration status: euvolemic  Follow-up not needed.        Visit Vitals  BP (!) 145/72   Pulse (!) 59   Temp 36.6 °C (97.9 °F)   Resp 18   Ht 5' 2" (1.575 m)   Wt 99.8 kg (220 lb)   SpO2 100%   Breastfeeding? No   BMI 40.24 kg/m²       Pain/Cj Score: Pain Assessment Performed: Yes (10/3/2018  9:11 AM)  Presence of Pain: denies (10/3/2018  9:11 AM)  Cj Score: 10 (10/3/2018  9:11 AM)        "

## 2018-10-03 NOTE — DISCHARGE INSTRUCTIONS
Eating a High-Fiber Diet  Fiber is what gives strength and structure to plants. Most grains, beans, vegetables, and fruits contain fiber. Foods rich in fiber are often low in calories and fat, and they fill you up more. They may also reduce your risks for certain health problems. To find out the amount of fiber in canned, packaged, or frozen foods, read the Nutrition Facts label. It tells you how much fiber is in a serving.    Types of fiber and their benefits  There are two types of fiber: insoluble and soluble. They both aid digestion and help you maintain a healthy weight.  · Insoluble fiber. This is found in whole grains, cereals, certain fruits and vegetables such as apple skin, corn, and carrots. Insoluble fiber may prevent constipation and reduce the risk for certain types of cancer.  · Soluble fiber. This type of fiber is in oats, beans, and certain fruits and vegetables such as strawberries and peas. Soluble fiber can reduce cholesterol, which may help lower the risk for heart disease. It also helps control blood sugar levels.  Look for high-fiber foods  Try these foods to add fiber to your diet:  · Whole-grain breads and cereals. Try to eat 6 to 8 ounces a day. Include wheat and oat bran cereals, whole-wheat muffins or toast, and corn tortillas in your meals.  · Fruits. Try to eat 2 cups a day. Apples, oranges, strawberries, pears, and bananas are good sources. (Note: Fruit juice is low in fiber.)  · Vegetables. Try to eat at least 2.5 cups a day. Add asparagus, carrots, broccoli, peas, and corn to your meals.  · Beans. One cup of cooked lentils gives you over 15 grams of fiber. Try navy beans, lentils, and chickpeas.  · Seeds. A small handful of seeds gives you about 3 grams of fiber. Try sunflower seeds.  Keep track of your fiber  Keep track of how much fiber you eat. Start by reading food labels. Then eat a variety of foods high in fiber. As you begin to eat more fiber, ask your healthcare provider  how much water you should be drinking to keep your digestive system working smoothly.  You should aim for a certain amount of fiber in your diet each day. If you are a woman, that amount is between 25 and 28 grams per day. Men should aim for 30 to 33 grams per day. After age 50, your daily fiber needs drop to 22 grams for women and 28 grams for men.  Before you reach for the fiber supplements, think about this. Fiber is found naturally in healthy whole foods. It gives you that feeling of fullness after you eat. Taking fiber supplements or eating fiber-enriched foods will not give you this full feeling.  Your fiber intake is a good measure for the quality of your overall diet. If you are missing out on your daily amount of fiber, you may be lacking other important nutrients as well.  Date Last Reviewed: 5/11/2015 © 2000-2017 [employer]. [address] [place] [address]. All rights reserved. This information is not intended as a substitute for professional medical care. Always follow your healthcare professional's instructions.        What Is a Hiatal Hernia?    Hiatal hernia is when the area where the stomach and esophagus meet bulges up through the diaphragm into the chest cavity. In some cases, part of the stomach may bulge above the diaphragm. Stomach acid may move up into the esophagus and cause symptoms. The symptoms are often blamed on gastroesophageal reflux disease (GERD). You may only know about the hernia when it shows up on an X-ray taken for other reasons.   What you may feel  The hiatus is a normal hole in the diaphragm. The esophagus passes through this hole and leads to the stomach. In some cases, part of the stomach may bulge above the diaphragm. This bulge is called a hernia. Stomach acid may move up into the esophagus and cause symptoms.  When you eat, the muscle at the hiatus relaxes to allow food to pass into the stomach. It tightens again to keep food and digestive acids  in the stomach.  Many people with hiatal hernias have mild symptoms. You may notice the following GERD symptoms:  · Heartburn or other chest discomfort  · A feeling of chest fullness after a meal  · Frequent burping  · Acid taste in the mouth  · Trouble swallowing  Treating symptoms  If you have been diagnosed with hiatal hernia, these suggestions may help improve symptoms:  · Lose excess weight. Extra weight puts pressure on the stomach and esophagus.  · Dont lie down after eating. Sit up for at least an hour after eating. Lying down after eating can increase symptoms.  · Avoid certain foods and drinks. These include fatty foods, chocolate, coffee, mint, and other foods that cause symptoms for you.  · Dont smoke or drink alcohol. These can worsen symptoms.  · Look at your medicines. Discuss your medicines with your healthcare provider. Many medicines can cause symptoms.  · Consider an antacid medicine. Ask your healthcare provider about over-the-counter and prescription medicines that may help.  · Ask about surgery, if needed. Surgery is a treatment choice for some people. Your healthcare provider can determine if surgery is an option for you.    Date Last Reviewed: 10/1/2016  © 2274-6546 [employer]. [address] [place] [address]. All rights reserved. This information is not intended as a substitute for professional medical care. Always follow your healthcare professional's instructions.        Understanding Diverticulosis and Diverticulitis     Pouches or diverticula usually occur in the lower part of the colon called the sigmoid.     The colon (large intestine) is the last part of the digestive tract. It absorbs water from stool and changes it from a liquid to a solid. In certain cases, small pouches called diverticula can form in the colon wall. This condition is called diverticulosis. The pouches can become infected. If this happens, it becomes a more serious problem called  diverticulitis. These problems can be painful. But they can be managed.  Managing your condition  Diet changes or medicines may be prescribed.   If you have diverticulosis  Recommendations include:  · Diet changes are often enough to control symptoms. The main changes are adding fiber (roughage) and drinking more water. Fiber absorbs water as it travels through your colon. This helps your stool stay soft and move smoothly. Water helps this process.  · If needed, you may be told to take over-the-counter stool softeners.  · To help relieve pain, antispasmodic medicines may be prescribed.  · Watch for changes in your bowel movements. Tell the healthcare provider if you notice any changes.  · Begin an exercise program. Ask your healthcare provider how to get started.  · Get plenty of rest and sleep.   If you have diverticulitis  Treatment depends on how bad your symptoms are.  · For mild symptoms. You may be put on a liquid diet for a short time. Antibiotics are usually prescribed. If these two steps relieve your symptoms, you may then be prescribed a high-fiber diet. If you still have symptoms, your healthcare provider will discuss more treatment choices with you.  · For severe symptoms. You may need to be admitted to the hospital. There, you can be given IV antibiotics and fluids. You will also be put on a low-fiber or liquid diet. Although not common, surgery is needed in some people with severe symptoms.  Anvik to colon health     Diverticulitis occurs when the pouches become infected or inflamed.     Help keep your colon healthy with a diet that includes plenty of high-fiber fruits, vegetables, and whole grains. Drink plenty of liquids like water and juice. Maintain a healthy lifestyle including regular exercise, stress management, and adequate rest and sleep.   Date Last Reviewed: 7/1/2016  © 8868-0974 [employer]. [address] [place] [address], [place] [address]. All rights reserved. This information is not  intended as a substitute for professional medical care. Always follow your healthcare professional's instructions.

## 2018-10-03 NOTE — TRANSFER OF CARE
"Anesthesia Transfer of Care Note    Patient: Colette Manriquez    Procedure(s) Performed: Procedure(s) (LRB):  EGD (ESOPHAGOGASTRODUODENOSCOPY) (N/A)  COLONOSCOPY (N/A)    Anesthesia PACU Handoff    Last vitals:   Visit Vitals  BP (!) 90/56   Pulse 63   Temp 36.6 °C (97.9 °F)   Resp 18   Ht 5' 2" (1.575 m)   Wt 99.8 kg (220 lb)   SpO2 97%   Breastfeeding? No   BMI 40.24 kg/m²     "

## 2018-10-10 ENCOUNTER — TELEPHONE (OUTPATIENT)
Dept: ENDOSCOPY | Facility: HOSPITAL | Age: 73
End: 2018-10-10

## 2018-10-15 ENCOUNTER — TELEPHONE (OUTPATIENT)
Dept: GASTROENTEROLOGY | Facility: CLINIC | Age: 73
End: 2018-10-15

## 2018-10-15 NOTE — TELEPHONE ENCOUNTER
----- Message from Binh Holt MD sent at 10/15/2018  7:46 AM CDT -----  Please notify patient, the stomach biopsies did not reveal any H.pylori. Follow up with Dr.Lagarde Chua.

## 2018-10-15 NOTE — PROGRESS NOTES
Please notify patient, the stomach biopsies did not reveal any H.pylori. Follow up with Dr.Lagarde Chua.

## 2018-12-05 ENCOUNTER — OFFICE VISIT (OUTPATIENT)
Dept: UROLOGY | Facility: CLINIC | Age: 73
End: 2018-12-05
Attending: UROLOGY
Payer: MEDICARE

## 2018-12-05 ENCOUNTER — TELEPHONE (OUTPATIENT)
Dept: UROLOGY | Facility: CLINIC | Age: 73
End: 2018-12-05

## 2018-12-05 VITALS
WEIGHT: 212.88 LBS | HEART RATE: 66 BPM | HEIGHT: 62 IN | DIASTOLIC BLOOD PRESSURE: 73 MMHG | BODY MASS INDEX: 39.17 KG/M2 | SYSTOLIC BLOOD PRESSURE: 181 MMHG

## 2018-12-05 DIAGNOSIS — N39.46 MIXED INCONTINENCE URGE AND STRESS: Primary | ICD-10-CM

## 2018-12-05 DIAGNOSIS — N32.81 OAB (OVERACTIVE BLADDER): ICD-10-CM

## 2018-12-05 LAB
BILIRUB SERPL-MCNC: ABNORMAL MG/DL
BLOOD URINE, POC: ABNORMAL
COLOR, POC UA: ABNORMAL
GLUCOSE UR QL STRIP: NORMAL
KETONES UR QL STRIP: ABNORMAL
LEUKOCYTE ESTERASE URINE, POC: ABNORMAL
NITRITE, POC UA: ABNORMAL
PH, POC UA: 5
PROTEIN, POC: ABNORMAL
SPECIFIC GRAVITY, POC UA: 1.01
UROBILINOGEN, POC UA: NORMAL

## 2018-12-05 PROCEDURE — 81002 URINALYSIS NONAUTO W/O SCOPE: CPT | Mod: S$GLB,,, | Performed by: UROLOGY

## 2018-12-05 PROCEDURE — 1101F PT FALLS ASSESS-DOCD LE1/YR: CPT | Mod: CPTII,S$GLB,, | Performed by: UROLOGY

## 2018-12-05 PROCEDURE — 99214 OFFICE O/P EST MOD 30 MIN: CPT | Mod: 25,S$GLB,, | Performed by: UROLOGY

## 2018-12-05 RX ORDER — AMLODIPINE BESYLATE 10 MG/1
TABLET ORAL
COMMUNITY
Start: 2018-10-11

## 2018-12-05 RX ORDER — SOLIFENACIN SUCCINATE 10 MG/1
10 TABLET, FILM COATED ORAL DAILY
Qty: 90 TABLET | Refills: 3 | Status: SHIPPED | OUTPATIENT
Start: 2018-12-05 | End: 2018-12-05 | Stop reason: SDUPTHER

## 2018-12-05 RX ORDER — ZOSTER VACCINE RECOMBINANT, ADJUVANTED 50 MCG/0.5
KIT INTRAMUSCULAR
Refills: 0 | COMMUNITY
Start: 2018-09-22

## 2018-12-05 RX ORDER — SOLIFENACIN SUCCINATE 10 MG/1
10 TABLET, FILM COATED ORAL DAILY
Qty: 90 TABLET | Refills: 3 | Status: SHIPPED | OUTPATIENT
Start: 2018-12-05

## 2018-12-05 NOTE — TELEPHONE ENCOUNTER
Spoke to pt advised per [facility] they are not allowed to accept prescriptions from an outside physician. Pt is asking if  can resend rx for vesicare to [facility] pharmacy. Pt was seen today at [facility].-alexander

## 2018-12-05 NOTE — TELEPHONE ENCOUNTER
----- Message from Melissa Moore sent at 12/5/2018 10:15 AM CST -----  Contact: [facility]            Name of Who is Calling: [facility]      What is the request in detail: Pharmacy states a prescription was received from the clinical team. Pharmacy states they are unable to fill a prescription for an outside Physician.       Can the clinic reply by [facility]: N      What Number to Call Back if not in [facility]: [phone]

## 2018-12-05 NOTE — PROGRESS NOTES
Subjective:       Colette Manriquez is a 73 y.o. female who is an established patient who was referred by Dr Jacob for evaluation of UI.      Urinary Incontinence  Patient complains of urinary incontinence. This has been present for many years. She leaks urine with coughing, lifting, laughing, sneezing, insensate leakage, and with urge (most bothersome). Patient describes the symptoms as frequent urination (manyx per day), nocturia 3 times per night, sensation of incomplete emptying of bladder, urge to urinate with little or no warning and urine leaking unpredictably. Factors associated with symptoms include none known. Evaluation to date includes none. Treatment to date includes Botox 200u by Dr Tobar in 12/2011 that was helpful. She remains on Myrbetriq now, not as helpful as previous. She tried PFPT in the past.     She is wearing diapers at night, multiple pads during day. She drinks a lot of water but avoids bladder irritants. Denies UTIs.     PVR (bladder scan) last visits: 37-80cc (on different scans),  21cc (not true PVR), 29cc.    She was with Myrbetriq and Vesicare - some improvement but still with diapers at night and pads during day. Declined further intervention previously. She is happy with symptoms.     Myrbetriq stopped from PCP due to bradycardia/hypotension. Remains on Vesicare. Still with significant leakage but does not want to do any further procedures.       The following portions of the patient's history were reviewed and updated as appropriate: allergies, current medications, past family history, past medical history, past social history, past surgical history and problem list.    Review of Systems  Constitutional: no fever or chills  ENT: no nasal congestion or sore throat  Respiratory: no cough or shortness of breath  Cardiovascular: no chest pain or palpitations  Gastrointestinal: no nausea or vomiting, tolerating diet  Genitourinary: as per HPI  Hematologic/Lymphatic: no easy  "bruising or lymphadenopathy  Musculoskeletal: no arthralgias or myalgias  Skin: no rashes or lesions  Neurological: no seizures or tremors  Behavioral/Psych: no auditory or visual hallucinations       Objective:    Vitals: BP (!) 181/73 (BP Location: Right arm, Patient Position: Sitting, BP Method: Large (Automatic))   Pulse 66   Ht 5' 2" (1.575 m)   Wt 96.6 kg (212 lb 13.7 oz)   BMI 38.93 kg/m²     Physical Exam   General: well developed, well nourished in no acute distress  Head: normocephalic, atraumatic  Neck: supple, trachea midline, no obvious enlargement of thyroid  HEENT: EOMI, mucus membranes moist, sclera anicteric, no hearing impairment  Lungs: symmetric expansion, non-labored breathing  Cardiovascular: regular rate and rhythm, normal pulses  Abdomen: soft, non tender, non distended, no palpable masses, no hepatosplenomegaly, no hernias, no CVA tenderness  Musculoskeletal: no peripheral edema, normal ROM in bilateral upper and lower extremities  Lymphatics: no cervical or inguinal lymphadenopathy  Skin: no rashes or lesions  Neuro: alert and oriented x 3, no gross deficits  Psych: normal judgment and insight, normal mood/affect and non-anxious  Genitourinary:   patient declined exam      Lab Review   Urine analysis today in clinic shows - trace LE, 5-10 RBCs    Lab Results   Component Value Date    WBC 3.60 (L) 12/05/2011    HGB 12.4 12/05/2011    HCT 38.2 12/05/2011    MCV 86.6 12/05/2011     12/05/2011     Lab Results   Component Value Date    CREATININE 1.2 12/05/2011    BUN 22 12/05/2011     Imaging  NA       Assessment/Plan:      1. Mixed incontinence urge and stress / OAB   - Discussed difference of UUI and JE components. Reviewed etiology and workup of each.   - JE: Kegels, PFPT, pessary, bulking agent, MUS.   - UUI: Behavioral changes, PFPT, anticholinergics, mirabegron. Botox/InterStim for refractory UUI.   - UUI much more bothersome   - Prior Botox in 2011   - Continue Vesicare. " Myrbetriq stopped by PCP due to bradycardia.    - Consider cysto/UDS in near future. ?Repeat Botox ?InterStim. She is not inclined to pursue this at this time. Will consider Botox in future.           Follow up in 12 months with PVR

## 2019-05-28 DIAGNOSIS — R89.9 ABNORMAL LABORATORY TEST: Primary | ICD-10-CM
